# Patient Record
Sex: FEMALE | Race: WHITE | NOT HISPANIC OR LATINO | URBAN - METROPOLITAN AREA
[De-identification: names, ages, dates, MRNs, and addresses within clinical notes are randomized per-mention and may not be internally consistent; named-entity substitution may affect disease eponyms.]

---

## 2022-01-06 ENCOUNTER — PREPPED CHART (OUTPATIENT)
Dept: URBAN - METROPOLITAN AREA CLINIC 21 | Facility: CLINIC | Age: 77
End: 2022-01-06

## 2022-01-06 RX ORDER — BRIMONIDINE TARTRATE 0.25 MG/ML: 1 SOLUTION/ DROPS OPHTHALMIC

## 2022-01-06 NOTE — PATIENT DISCUSSION
Punctal plugs inserted into both lower eyelids. Patient instructed to call if any irritation or redness.

## 2022-01-06 NOTE — PATIENT DISCUSSION
Continue: - * FML: 1 DROP ONCE A DAY IN EACH EYE -*  OASIS OR RETAINE MGD 2-3 TIMES A DAY IN EACH EYE.- * LUMIFY ONCE DAILY TO BOTH EYES FOR REDNESS RELIEF.

## 2022-05-09 ENCOUNTER — ESTABLISHED PATIENT (OUTPATIENT)
Dept: URBAN - METROPOLITAN AREA CLINIC 21 | Facility: CLINIC | Age: 77
End: 2022-05-09

## 2022-05-09 DIAGNOSIS — E11.3293: ICD-10-CM

## 2022-05-09 DIAGNOSIS — H16.223: ICD-10-CM

## 2022-05-09 PROCEDURE — 68761 CLOSE TEAR DUCT OPENING: CPT

## 2022-05-09 PROCEDURE — 99214 OFFICE O/P EST MOD 30 MIN: CPT

## 2022-05-09 ASSESSMENT — TONOMETRY
OD_IOP_MMHG: 14
OS_IOP_MMHG: 13

## 2022-05-09 ASSESSMENT — VISUAL ACUITY
OS_SC: 20/40
OS_PH: 20/30
OD_SC: 20/30
OU_SC: J1+

## 2022-05-09 NOTE — PROCEDURE NOTE: CLINICAL
PROCEDURE NOTE: Punctal Plugs, Extended OU. Diagnosis: Mild Keratoconjunctivitis Sicca. Prior to treatment, the risks/benefits/alternatives were discussed. The patient wished to proceed with procedure. Extended duration plugs were inserted. Brand: Any Espinosarylee. Size: 0.3mm Location: RLL/LLL punctum. Patient tolerated procedure well. There were no complications. Post procedure instructions given. Félix Garcia

## 2022-09-12 ENCOUNTER — ESTABLISHED PATIENT (OUTPATIENT)
Dept: URBAN - METROPOLITAN AREA CLINIC 21 | Facility: CLINIC | Age: 77
End: 2022-09-12

## 2022-09-12 DIAGNOSIS — E11.3293: ICD-10-CM

## 2022-09-12 DIAGNOSIS — H16.223: ICD-10-CM

## 2022-09-12 PROCEDURE — 92134 CPTRZ OPH DX IMG PST SGM RTA: CPT

## 2022-09-12 PROCEDURE — 92014 COMPRE OPH EXAM EST PT 1/>: CPT

## 2022-09-12 PROCEDURE — 68761 CLOSE TEAR DUCT OPENING: CPT

## 2022-09-12 ASSESSMENT — TONOMETRY
OD_IOP_MMHG: 8
OS_IOP_MMHG: 9

## 2022-09-12 ASSESSMENT — VISUAL ACUITY
OD_SC: 20/30+2
OS_SC: 20/30-2

## 2022-09-12 NOTE — PROCEDURE NOTE: CLINICAL
PROCEDURE NOTE: Punctal Plugs, Extended Bilateral Upper Lids. Diagnosis: Mild Keratoconjunctivitis Sicca. Prior to treatment, the risks/benefits/alternatives were discussed. The patient wished to proceed with procedure. Extended duration plugs were inserted. Brand: *. Size: *mm Location: * punctum. Patient tolerated procedure well. There were no complications. Post procedure instructions given. Tracey Busch

## 2022-09-12 NOTE — PATIENT DISCUSSION
Continue: - FML: 1 DROP ONCE A DAY IN EACH EYE //  OASIS OR RETAINE MGD 2-3 TIMES A DAY IN EACH EYE.-// LUMIFY ONCE DAILY TO BOTH EYES FOR REDNESS RELIEF.

## 2023-01-26 ENCOUNTER — ESTABLISHED PATIENT (OUTPATIENT)
Dept: URBAN - METROPOLITAN AREA CLINIC 21 | Facility: CLINIC | Age: 78
End: 2023-01-26

## 2023-01-26 DIAGNOSIS — H16.223: ICD-10-CM

## 2023-01-26 DIAGNOSIS — E11.3293: ICD-10-CM

## 2023-01-26 PROCEDURE — 99213 OFFICE O/P EST LOW 20 MIN: CPT

## 2023-01-26 PROCEDURE — 68761 CLOSE TEAR DUCT OPENING: CPT

## 2023-01-26 ASSESSMENT — VISUAL ACUITY
OD_SC: 20/25-2
OS_SC: 20/40+2
OU_SC: 20/30+2

## 2023-01-26 ASSESSMENT — TONOMETRY
OD_IOP_MMHG: 11
OS_IOP_MMHG: 12

## 2023-01-26 NOTE — PROCEDURE NOTE: CLINICAL
PROCEDURE NOTE: Punctal Plugs, Extended #3 Bilateral Lower Lids. Diagnosis: Mild Keratoconjunctivitis Sicca. Prior to treatment, the risks/benefits/alternatives were discussed. The patient wished to proceed with procedure. Extended duration plugs were inserted. Brand: Michelle Piedad. Size: 0.3mm Location: RLL/LLL punctum. Patient tolerated procedure well. There were no complications. Post procedure instructions given. Isidro Osier

## 2023-05-30 ENCOUNTER — ESTABLISHED PATIENT (OUTPATIENT)
Dept: URBAN - METROPOLITAN AREA CLINIC 21 | Facility: CLINIC | Age: 78
End: 2023-05-30

## 2023-05-30 DIAGNOSIS — E11.9: ICD-10-CM

## 2023-05-30 DIAGNOSIS — H16.223: ICD-10-CM

## 2023-05-30 PROCEDURE — 68761 CLOSE TEAR DUCT OPENING: CPT

## 2023-05-30 PROCEDURE — 99214 OFFICE O/P EST MOD 30 MIN: CPT

## 2023-05-30 RX ORDER — MINERAL OIL 2; 2 MG/.4ML; MG/.4ML: 1 EMULSION OPHTHALMIC TWICE A DAY

## 2023-05-30 ASSESSMENT — TONOMETRY
OD_IOP_MMHG: 10
OS_IOP_MMHG: 9

## 2023-05-30 ASSESSMENT — VISUAL ACUITY
OU_SC: 20/30
OD_SC: 20/30
OS_SC: 20/30

## 2023-10-03 ENCOUNTER — ESTABLISHED PATIENT (OUTPATIENT)
Dept: URBAN - METROPOLITAN AREA CLINIC 21 | Facility: CLINIC | Age: 78
End: 2023-10-03

## 2023-10-03 DIAGNOSIS — H16.223: ICD-10-CM

## 2023-10-03 PROCEDURE — 99213 OFFICE O/P EST LOW 20 MIN: CPT

## 2023-10-03 PROCEDURE — 68761 CLOSE TEAR DUCT OPENING: CPT

## 2023-10-03 ASSESSMENT — VISUAL ACUITY
OS_SC: 20/30
OD_SC: 20/30
OU_SC: 20/25-2

## 2023-10-03 ASSESSMENT — TONOMETRY
OD_IOP_MMHG: 8
OS_IOP_MMHG: 8